# Patient Record
Sex: MALE | Race: WHITE | NOT HISPANIC OR LATINO | Employment: FULL TIME | URBAN - METROPOLITAN AREA
[De-identification: names, ages, dates, MRNs, and addresses within clinical notes are randomized per-mention and may not be internally consistent; named-entity substitution may affect disease eponyms.]

---

## 2019-10-13 ENCOUNTER — OUTSIDE PLACE OF SERVICE (OUTPATIENT)
Dept: ADMINISTRATIVE | Facility: OTHER | Age: 54
End: 2019-10-13
Payer: COMMERCIAL

## 2019-10-13 PROCEDURE — 99223 1ST HOSP IP/OBS HIGH 75: CPT | Mod: ,,, | Performed by: UROLOGY

## 2019-10-13 PROCEDURE — 99223 PR INITIAL HOSPITAL CARE,LEVL III: ICD-10-PCS | Mod: ,,, | Performed by: UROLOGY

## 2019-11-04 ENCOUNTER — LAB VISIT (OUTPATIENT)
Dept: LAB | Facility: HOSPITAL | Age: 54
End: 2019-11-04
Attending: UROLOGY
Payer: COMMERCIAL

## 2019-11-04 ENCOUNTER — OFFICE VISIT (OUTPATIENT)
Dept: UROLOGY | Facility: CLINIC | Age: 54
End: 2019-11-04
Payer: COMMERCIAL

## 2019-11-04 VITALS
HEART RATE: 82 BPM | DIASTOLIC BLOOD PRESSURE: 69 MMHG | BODY MASS INDEX: 28.72 KG/M2 | SYSTOLIC BLOOD PRESSURE: 118 MMHG | HEIGHT: 70 IN | WEIGHT: 200.63 LBS

## 2019-11-04 DIAGNOSIS — N40.0 BPH WITHOUT URINARY OBSTRUCTION: ICD-10-CM

## 2019-11-04 DIAGNOSIS — R97.20 ELEVATED PSA: Primary | ICD-10-CM

## 2019-11-04 DIAGNOSIS — R97.20 ELEVATED PSA: ICD-10-CM

## 2019-11-04 PROCEDURE — 84153 ASSAY OF PSA TOTAL: CPT

## 2019-11-04 PROCEDURE — 3008F PR BODY MASS INDEX (BMI) DOCUMENTED: ICD-10-PCS | Mod: CPTII,S$GLB,, | Performed by: UROLOGY

## 2019-11-04 PROCEDURE — 99999 PR PBB SHADOW E&M-NEW PATIENT-LVL III: CPT | Mod: PBBFAC,,, | Performed by: UROLOGY

## 2019-11-04 PROCEDURE — 36415 COLL VENOUS BLD VENIPUNCTURE: CPT | Mod: PO

## 2019-11-04 PROCEDURE — 99214 OFFICE O/P EST MOD 30 MIN: CPT | Mod: S$GLB,,, | Performed by: UROLOGY

## 2019-11-04 PROCEDURE — 99214 PR OFFICE/OUTPT VISIT, EST, LEVL IV, 30-39 MIN: ICD-10-PCS | Mod: S$GLB,,, | Performed by: UROLOGY

## 2019-11-04 PROCEDURE — 3008F BODY MASS INDEX DOCD: CPT | Mod: CPTII,S$GLB,, | Performed by: UROLOGY

## 2019-11-04 PROCEDURE — 99999 PR PBB SHADOW E&M-NEW PATIENT-LVL III: ICD-10-PCS | Mod: PBBFAC,,, | Performed by: UROLOGY

## 2019-11-04 RX ORDER — BISACODYL 5 MG
TABLET, DELAYED RELEASE (ENTERIC COATED) ORAL
COMMUNITY
End: 2020-06-10

## 2019-11-04 RX ORDER — SIMVASTATIN 20 MG/1
20 TABLET, FILM COATED ORAL NIGHTLY
Refills: 5 | COMMUNITY
Start: 2019-10-24

## 2019-11-04 RX ORDER — LISINOPRIL 10 MG/1
10 TABLET ORAL DAILY
Refills: 3 | COMMUNITY
Start: 2019-08-26 | End: 2020-06-10

## 2019-11-04 RX ORDER — APIXABAN 5 MG/1
5 TABLET, FILM COATED ORAL 2 TIMES DAILY
Refills: 1 | Status: ON HOLD | COMMUNITY
Start: 2019-10-16 | End: 2019-12-12

## 2019-11-04 RX ORDER — METOPROLOL TARTRATE 25 MG/1
25 TABLET, FILM COATED ORAL 2 TIMES DAILY
Refills: 6 | COMMUNITY
Start: 2019-10-25

## 2019-11-04 RX ORDER — DIGOXIN 250 MCG
TABLET ORAL
Refills: 1 | COMMUNITY
Start: 2019-10-16 | End: 2020-06-10

## 2019-11-04 NOTE — PROGRESS NOTES
Subjective:       Patient ID: Kristofer Angel is a 54 y.o. male.    Chief Complaint: Elevated PSA    HPI     54-year-old who initially presented with a superficial skin infection in his axilla.  He was treated with course of Bactrim and he also received a flu shot at the same time.  He then began having high fevers along with a global skin rash.  He was seen in the emergency room and was admitted.  His lactate was elevated and his urinalysis did show positive leukocyte esterase.  He was diagnosed with possible prostatitis.  His urine cultures and blood cultures were negative.  He completed a course of Levaquin.  Also at the time of his evaluation in the emergency room along with a battery of other lab test a PSA was drawn which was elevated at 10.3.  He has no family history of prostate cancer.  No previous PSA.  Today he has no symptoms the rash is resolved he has no more fever he has no bothersome urinary symptoms.    Past Medical History:   Diagnosis Date    Atrial fibrillation        History reviewed. No pertinent surgical history.      Current Outpatient Medications:     bisacodyl (DULCOLAX, BISACODYL,) 5 mg EC tablet, Dulcolax (bisacodyl) 5 mg tablet,delayed release  Take 2 tablets every day by oral route as directed for 1 day., Disp: , Rfl:     digoxin (LANOXIN) 250 mcg tablet, TAKE 1 TABLET BY MOUTH EVERY DAY AT 12 00, Disp: , Rfl: 1    ELIQUIS 5 mg Tab, Take 5 mg by mouth 2 (two) times daily., Disp: , Rfl: 1    lisinopril 10 MG tablet, Take 10 mg by mouth once daily., Disp: , Rfl: 3    metoprolol tartrate (LOPRESSOR) 25 MG tablet, Take 25 mg by mouth 2 (two) times daily., Disp: , Rfl: 6    simvastatin (ZOCOR) 20 MG tablet, Take 20 mg by mouth nightly., Disp: , Rfl: 5     Review of Systems   Constitutional: Negative for fever.   Genitourinary: Negative for dysuria and hematuria.       Objective:      Physical Exam   Constitutional: He is oriented to person, place, and time. He appears well-developed  and well-nourished.   HENT:   Head: Normocephalic and atraumatic.   Eyes: Conjunctivae are normal.   Cardiovascular: Normal rate.   Pulmonary/Chest: Effort normal.   Genitourinary: Rectal exam shows no mass and anal tone normal. Prostate is enlarged (30g, s/s/a). Prostate is not tender.   Musculoskeletal: Normal range of motion. He exhibits no edema.   Neurological: He is alert and oriented to person, place, and time.   Skin: Skin is warm and dry. No rash noted.   Psychiatric: He has a normal mood and affect.   Vitals reviewed.      Assessment:       1. Elevated PSA    2. BPH without urinary obstruction        Plan:       Elevated PSA  -     Prostate Specific Antigen, Diagnostic; Future; Expected date: 11/04/2019    BPH without urinary obstruction      We discussed screening PSA and its limitation.  We discussed the possibility of early stage prostate cancer.  We discussed prostate needle biopsy.  I recommend we repeat PSA for now.

## 2019-11-05 ENCOUNTER — TELEPHONE (OUTPATIENT)
Dept: UROLOGY | Facility: CLINIC | Age: 54
End: 2019-11-05

## 2019-11-05 LAB — COMPLEXED PSA SERPL-MCNC: 2.1 NG/ML (ref 0–4)

## 2019-11-05 NOTE — TELEPHONE ENCOUNTER
----- Message from Frannie Eugene sent at 11/5/2019  1:00 PM CST -----  Contact: emilie-wife  Pt wife Emilie states that pt had some labs done yesterday and supposed to get results today. So she is call ing for those....[phone]

## 2019-11-22 DIAGNOSIS — I48.91 ATRIAL FIBRILLATION, UNSPECIFIED TYPE: Primary | ICD-10-CM

## 2019-12-02 ENCOUNTER — OFFICE VISIT (OUTPATIENT)
Dept: ELECTROPHYSIOLOGY | Facility: CLINIC | Age: 54
End: 2019-12-02
Payer: COMMERCIAL

## 2019-12-02 VITALS
SYSTOLIC BLOOD PRESSURE: 134 MMHG | DIASTOLIC BLOOD PRESSURE: 80 MMHG | WEIGHT: 205 LBS | HEIGHT: 70 IN | BODY MASS INDEX: 29.35 KG/M2 | HEART RATE: 67 BPM

## 2019-12-02 DIAGNOSIS — I10 ESSENTIAL HYPERTENSION: Chronic | ICD-10-CM

## 2019-12-02 DIAGNOSIS — I48.91 ATRIAL FIBRILLATION, UNSPECIFIED TYPE: ICD-10-CM

## 2019-12-02 DIAGNOSIS — I48.0 PAROXYSMAL ATRIAL FIBRILLATION: Primary | Chronic | ICD-10-CM

## 2019-12-02 PROCEDURE — 99203 PR OFFICE/OUTPT VISIT, NEW, LEVL III, 30-44 MIN: ICD-10-PCS | Mod: S$GLB,,, | Performed by: INTERNAL MEDICINE

## 2019-12-02 PROCEDURE — 93010 RHYTHM STRIP: ICD-10-PCS | Mod: S$GLB,,, | Performed by: INTERNAL MEDICINE

## 2019-12-02 PROCEDURE — 99999 PR PBB SHADOW E&M-EST. PATIENT-LVL IV: ICD-10-PCS | Mod: PBBFAC,,, | Performed by: INTERNAL MEDICINE

## 2019-12-02 PROCEDURE — 99999 PR PBB SHADOW E&M-EST. PATIENT-LVL IV: CPT | Mod: PBBFAC,,, | Performed by: INTERNAL MEDICINE

## 2019-12-02 PROCEDURE — 93010 ELECTROCARDIOGRAM REPORT: CPT | Mod: S$GLB,,, | Performed by: INTERNAL MEDICINE

## 2019-12-02 PROCEDURE — 93005 ELECTROCARDIOGRAM TRACING: CPT | Mod: S$GLB,,, | Performed by: INTERNAL MEDICINE

## 2019-12-02 PROCEDURE — 99203 OFFICE O/P NEW LOW 30 MIN: CPT | Mod: S$GLB,,, | Performed by: INTERNAL MEDICINE

## 2019-12-02 PROCEDURE — 93005 RHYTHM STRIP: ICD-10-PCS | Mod: S$GLB,,, | Performed by: INTERNAL MEDICINE

## 2019-12-02 RX ORDER — NAPROXEN SODIUM 220 MG/1
81 TABLET, FILM COATED ORAL DAILY
COMMUNITY

## 2019-12-02 NOTE — PROGRESS NOTES
Patient Instructions  Implant of Loop Recorder       Important Medication Information:    · You may take your usual morning medications on the day of your procedure.       Day of Procedure:    12/12/19 at 9 AM   Please report to Cardiology Waiting Room on the 3rd floor of the Hospital    · Wash your chest with HIBICLENS OR AN ANTIBACTERIAL SOAP (such as Dial) on the night before and the morning of your procedure.  · You do not need to fast for this procedure since only a local anesthesia (numbing) will be used.    · You will be going home after your procedure.   · You may want someone to drive you home from the hospital.        Directions to Cardiology Waiting Room  If you park in the Parking Garage:  Take elevators to the 2nd floor  Walk up ramp and turn right by Gold Elevators  Take elevator to the 3rd floor  Upon exiting the elevator, turn away from the clinic areas  Walk long davey around to front of hospital to area with windows overlooking [facility]  Check in at Reception Desk  OR  If family is dropping you off:  Have them drop you off at the front of the Hospital  (Near the ER, where all the flags are hung).  Take the E elevators to the 3rd floor.  Check in at the Reception Desk in the waiting room.      Post-Procedure Patient Discharge Instructions  Loop Recorders    Wound Care   If you are discharged with a standard dressing over the incision, you may remove the dressing after 24 hours.    If there are Steri-strips (strips of tape) over your incision, leave them on until your follow-up appointment. They may begin to fall off on their own, which is normal. If there is Dermabond (clear glue) over your incision, do not scrub it off. It acts as a barrier and will eventually disappear.   You may be discharged with 5 days of oral antibiotics. Please take the full prescription until it is gone.   Do not get the incision wet for 48 hours following the procedure. You may sponge bath during this period,  working around the incision. After 48 hours, you may shower, but you should still try to keep this area as dry as possible, and avoid direct water contact to the incision (allow the water to hit back of your shoulder rather than directly on the incision). Gently pat the incision dry if it does get wet.   You may take regular showers after 2 weeks, unless otherwise indicated at your follow-up visit.   Do not submerge the incision in a tub, pool, or body of water for 6 weeks.   If you notice unusual swelling, redness, drainage, have more device site pain, chest pain, shortness of breath, or have a fever greater than 100 degrees, call our device clinic immediately: [phone] or [phone] during normal office hours. You may call [phone] after-hours or on weekends and ask for the electrophysiologist on call.  Activity    If you were driving prior to the procedure, you may resume driving right after your procedure (as long you did not receive any sedation). If you have a history of passing out or a history of certain arrhythmias, there may be driving restrictions unrelated to the procedure. Please clarify with your physician if this is the case.   Avoid rough contact at the device site for 6 weeks.   You may participate in sexual activity unless otherwise instructed.   You may return to work the follow day unless told otherwise by your provider.  Long-Term Instructions   Metal Detectors at Airports: Metal detectors at airports do not interfere with you loop recorder.   MRI: You may have an MRI with an implantable loop recorder. All that is required is that you send a transmission to download your information before and after you have your MRI.  Long-Term Follow-Up   Your device has the ability to transmit device information from home to the doctors office using a home monitoring system.   This remote system takes the place of a doctors visit. Your device will be checked from home every 31  days. Every 12 months, you will be asked to come into the office for an in-office check.   Your device should last in the range of 3 years.       Any need to reschedule or cancel procedures, or any questions regarding your procedures should be addressed directly with the Arrhythmia Department Nurses at the following phone number: [phone].

## 2019-12-02 NOTE — PROGRESS NOTES
Subjective:    Patient ID:  Kristofer Angel is a 54 y.o. male who presents for evaluation of Atrial Fibrillation    HPI  Mr Angel is a 54 year old man with HTN who [place] [facility] as a new patient, self referral for second opinion regarding his atrial fibrillation. In mid October, he developed two boils under his left arm. He then developed sepsis and was admitted to an outside hospital ([facility] in [place]) with fevers of 104. He was also significantly dehydrated and in KRISTINE. He was incidentally noted to be in atrial fibrillation during this acute illness. He did not feel his heart racing. He was started on rate control agents and spontaneously converted after about 1.5 days per his recollection. In total, he was admitted for about 5 days. As part of that work up/hospital stay, he also had stress testing and coronary angiography (which revealed no significant coronary disease). He was started on apixaban, metoprolol, and digoxin for his atrial fibrillation. He wore a 5 day zio patch with Dr Giordano as an outpatient since being discharged. He thinks that was normal without afib, but those records are not available for review. He has had no symptomatic recurrences of afib. Once he was started on metoprolol, his lisinopril was discontinued. He snores but has no formal YURI diagnosis. He feels generally well and has taken himself off of apixaban. 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 He has no syncope or presyncope. His EF is normal based on outside records. He was offered an ILR by Dr Giordano for longer term monitoring but wanted to get a second opinion first.     ECG today:  Sinus rhythm     Review of Systems   Constitution: Negative for chills, decreased appetite, diaphoresis, malaise/fatigue and weight loss.   HENT: Negative for congestion, hearing loss, nosebleeds and sore throat.    Eyes: Negative for pain, redness and visual disturbance.   Cardiovascular: Negative for  chest pain, dyspnea on exertion, irregular heartbeat, leg swelling, orthopnea, palpitations and syncope.   Respiratory: Negative for cough, shortness of breath, sleep disturbances due to breathing and wheezing.    Endocrine: Negative for cold intolerance and heat intolerance.   Hematologic/Lymphatic: Negative for bleeding problem. Does not bruise/bleed easily.   Skin: Negative for color change, dry skin, poor wound healing and rash.   Musculoskeletal: Negative for arthritis, back pain, falls, joint pain, muscle weakness and myalgias.   Gastrointestinal: Negative for abdominal pain, change in bowel habit, constipation, diarrhea, hematochezia, melena and vomiting.   Genitourinary: Negative for dysuria, frequency, hematuria, nocturia and urgency.   Neurological: Negative for difficulty with concentration, disturbances in coordination, dizziness, focal weakness, headaches, light-headedness, numbness and weakness.   Psychiatric/Behavioral: Negative for altered mental status. The patient does not have insomnia.          Objective:    Physical Exam   Constitutional: He is oriented to person, place, and time. He appears well-developed and well-nourished. No distress.   HENT:   Head: Normocephalic and atraumatic.   Eyes: Conjunctivae and EOM are normal. Right eye exhibits no discharge. Left eye exhibits no discharge.   Neck: Neck supple. No JVD present.   Cardiovascular: Normal rate, regular rhythm, normal heart sounds and intact distal pulses.   No murmur heard.  Pulmonary/Chest: Effort normal and breath sounds normal. No respiratory distress. He has no wheezes. He has no rales. He exhibits no tenderness.   Abdominal: Soft. He exhibits no distension. There is no tenderness.   Musculoskeletal: Normal range of motion. He exhibits no edema.   Neurological: He is alert and oriented to person, place, and time.   Skin: Skin is warm and dry.   Vitals reviewed.        Assessment:       1. Paroxysmal atrial fibrillation    2. Atrial  fibrillation, unspecified type    3. Essential hypertension         Plan:       Mr Angel is a 54 year old man with HTN who presents to [facility] as a new patient, self referral for second opinion regarding his atrial fibrillation.       1. AFib, paroxysmal  - occurred in setting of acute illness (sepsis, dehydration, KRISTINE). No symptomatic recurrence. No recurrence based on outpatient 5 day zio patch (per pt)  - his [id number] is 1 (HTN). Agree that he can come off of apixaban for now. Stay on aspirin 81 mg po once daily. Discontinue digoxin given narrow therapeutic window and current sinus rhythm with no afib recurrence yet detected.   - continue metoprolol for now as it is also providing some antihypertensive effects. If his primary or general cardiologist need to adjust/address his antihypertensive regimen, we would agree with metoprolol discontinuation in favor of ACEi/ARB, dihydropyridine calcium channel antagonist, or thiazide diuretic.   - we would like to proceed with implantable loop recorder for continued monitoring. If AFib is detected in the future, we would then proceed with more aggressive medical therapy including rhythm control strategies and resumption of anticoagulation.     He will return to clinic 3 months after ILR is placed.

## 2019-12-02 NOTE — H&P (VIEW-ONLY)
Subjective:    Patient ID:  Kristofer Angel is a 54 y.o. male who presents for evaluation of Atrial Fibrillation    HPI  Mr Angel is a 54 year old man with HTN who presents to [facility] as a new patient, self referral for second opinion regarding his atrial fibrillation. In mid October, he developed two boils under his left arm. He then developed sepsis and was admitted to an outside hospital ([facility] in [place]) with fevers of 104. He was also significantly dehydrated and in KRISTINE. He was incidentally noted to be in atrial fibrillation during this acute illness. He did not feel his heart racing. He was started on rate control agents and spontaneously converted after about 1.5 days per his recollection. In total, he was admitted for about 5 days. As part of that work up/hospital stay, he also had stress testing and coronary angiography (which revealed no significant coronary disease). He was started on apixaban, metoprolol, and digoxin for his atrial fibrillation. He wore a 5 day zio patch with Dr Giordano as an outpatient since being discharged. He thinks that was normal without afib, but those records are not available for review. He has had no symptomatic recurrences of afib. Once he was started on metoprolol, his lisinopril was discontinued. He snores but has no formal YURI diagnosis. He feels generally well and has taken himself off of apixaban. 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 He has no syncope or presyncope. His EF is normal based on outside records. He was offered an ILR by Dr Giordano for longer term monitoring but wanted to get a second opinion first.     ECG today:  Sinus rhythm     Review of Systems   Constitution: Negative for chills, decreased appetite, diaphoresis, malaise/fatigue and weight loss.   HENT: Negative for congestion, hearing loss, nosebleeds and sore throat.    Eyes: Negative for pain, redness and visual disturbance.   Cardiovascular: Negative for  chest pain, dyspnea on exertion, irregular heartbeat, leg swelling, orthopnea, palpitations and syncope.   Respiratory: Negative for cough, shortness of breath, sleep disturbances due to breathing and wheezing.    Endocrine: Negative for cold intolerance and heat intolerance.   Hematologic/Lymphatic: Negative for bleeding problem. Does not bruise/bleed easily.   Skin: Negative for color change, dry skin, poor wound healing and rash.   Musculoskeletal: Negative for arthritis, back pain, falls, joint pain, muscle weakness and myalgias.   Gastrointestinal: Negative for abdominal pain, change in bowel habit, constipation, diarrhea, hematochezia, melena and vomiting.   Genitourinary: Negative for dysuria, frequency, hematuria, nocturia and urgency.   Neurological: Negative for difficulty with concentration, disturbances in coordination, dizziness, focal weakness, headaches, light-headedness, numbness and weakness.   Psychiatric/Behavioral: Negative for altered mental status. The patient does not have insomnia.          Objective:    Physical Exam   Constitutional: He is oriented to person, place, and time. He appears well-developed and well-nourished. No distress.   HENT:   Head: Normocephalic and atraumatic.   Eyes: Conjunctivae and EOM are normal. Right eye exhibits no discharge. Left eye exhibits no discharge.   Neck: Neck supple. No JVD present.   Cardiovascular: Normal rate, regular rhythm, normal heart sounds and intact distal pulses.   No murmur heard.  Pulmonary/Chest: Effort normal and breath sounds normal. No respiratory distress. He has no wheezes. He has no rales. He exhibits no tenderness.   Abdominal: Soft. He exhibits no distension. There is no tenderness.   Musculoskeletal: Normal range of motion. He exhibits no edema.   Neurological: He is alert and oriented to person, place, and time.   Skin: Skin is warm and dry.   Vitals reviewed.        Assessment:       1. Paroxysmal atrial fibrillation    2. Atrial  fibrillation, unspecified type    3. Essential hypertension         Plan:       Mr Angel is a 54 year old man with HTN who [place] [facility] as a new patient, self referral for second opinion regarding his atrial fibrillation.       1. AFib, paroxysmal  - occurred in setting of acute illness (sepsis, dehydration, KRISTINE). No symptomatic recurrence. No recurrence based on outpatient 5 day zio patch (per pt)  - his [id number] is 1 (HTN). Agree that he can come off of apixaban for now. Stay on aspirin 81 mg po once daily. Discontinue digoxin given narrow therapeutic window and current sinus rhythm with no afib recurrence yet detected.   - continue metoprolol for now as it is also providing some antihypertensive effects. If his primary or general cardiologist need to adjust/address his antihypertensive regimen, we would agree with metoprolol discontinuation in favor of ACEi/ARB, dihydropyridine calcium channel antagonist, or thiazide diuretic.   - we would like to proceed with implantable loop recorder for continued monitoring. If AFib is detected in the future, we would then proceed with more aggressive medical therapy including rhythm control strategies and resumption of anticoagulation.     He will return to clinic 3 months after ILR is placed.

## 2019-12-11 ENCOUNTER — TELEPHONE (OUTPATIENT)
Dept: ELECTROPHYSIOLOGY | Facility: CLINIC | Age: 54
End: 2019-12-11

## 2019-12-11 NOTE — TELEPHONE ENCOUNTER
Attempted to reach pt to confirm procedure for tomorrow. No answer. Left voice message with number for pt to return call. Advised that his procedure time was pushed back for an arrival time of 11 am. Asked for pt to return call.

## 2019-12-12 ENCOUNTER — HOSPITAL ENCOUNTER (OUTPATIENT)
Facility: HOSPITAL | Age: 54
Discharge: HOME | End: 2019-12-12
Attending: INTERNAL MEDICINE | Admitting: INTERNAL MEDICINE
Payer: COMMERCIAL

## 2019-12-12 VITALS
RESPIRATION RATE: 18 BRPM | HEART RATE: 62 BPM | OXYGEN SATURATION: 96 % | TEMPERATURE: 97 F | HEIGHT: 70 IN | SYSTOLIC BLOOD PRESSURE: 123 MMHG | WEIGHT: 205 LBS | DIASTOLIC BLOOD PRESSURE: 82 MMHG | BODY MASS INDEX: 29.35 KG/M2

## 2019-12-12 DIAGNOSIS — I48.0 PAF (PAROXYSMAL ATRIAL FIBRILLATION): Primary | ICD-10-CM

## 2019-12-12 DIAGNOSIS — I48.91 ATRIAL FIBRILLATION: ICD-10-CM

## 2019-12-12 PROCEDURE — 25000003 PHARM REV CODE 250: Performed by: INTERNAL MEDICINE

## 2019-12-12 PROCEDURE — C1764 EVENT RECORDER, CARDIAC: HCPCS | Performed by: INTERNAL MEDICINE

## 2019-12-12 PROCEDURE — 63600175 PHARM REV CODE 636 W HCPCS: Performed by: INTERNAL MEDICINE

## 2019-12-12 PROCEDURE — 33285 INSJ SUBQ CAR RHYTHM MNTR: CPT | Performed by: INTERNAL MEDICINE

## 2019-12-12 PROCEDURE — 33285 PR INSERTION,SUBQ CARDIAC RHYTHM MONITOR, W/PRGRMG: ICD-10-PCS | Mod: ,,, | Performed by: INTERNAL MEDICINE

## 2019-12-12 PROCEDURE — 93010 EKG 12-LEAD: ICD-10-PCS | Mod: ,,, | Performed by: INTERNAL MEDICINE

## 2019-12-12 PROCEDURE — 93010 ELECTROCARDIOGRAM REPORT: CPT | Mod: ,,, | Performed by: INTERNAL MEDICINE

## 2019-12-12 PROCEDURE — 33285 INSJ SUBQ CAR RHYTHM MNTR: CPT | Mod: ,,, | Performed by: INTERNAL MEDICINE

## 2019-12-12 PROCEDURE — 93005 ELECTROCARDIOGRAM TRACING: CPT | Mod: 59

## 2019-12-12 DEVICE — MON LNQ11 REVEAL LINQ USA FW2.0
Type: IMPLANTABLE DEVICE | Site: CHEST | Status: FUNCTIONAL
Brand: REVEAL LINQ™

## 2019-12-12 RX ORDER — CEFAZOLIN SODIUM 1 G/3ML
2 INJECTION, POWDER, FOR SOLUTION INTRAMUSCULAR; INTRAVENOUS
Status: DISCONTINUED | OUTPATIENT
Start: 2019-12-12 | End: 2019-12-12 | Stop reason: HOSPADM

## 2019-12-12 RX ORDER — CEFAZOLIN SODIUM 1 G/3ML
INJECTION, POWDER, FOR SOLUTION INTRAMUSCULAR; INTRAVENOUS
Status: DISCONTINUED | OUTPATIENT
Start: 2019-12-12 | End: 2019-12-12 | Stop reason: HOSPADM

## 2019-12-12 RX ORDER — LIDOCAINE HYDROCHLORIDE 20 MG/ML
INJECTION, SOLUTION EPIDURAL; INFILTRATION; INTRACAUDAL; PERINEURAL
Status: DISCONTINUED | OUTPATIENT
Start: 2019-12-12 | End: 2019-12-12 | Stop reason: HOSPADM

## 2019-12-12 NOTE — DISCHARGE SUMMARY
[facility] - Short Stay Cardiac Unit  Cardiac Electrophysiology  Discharge Summary      Patient Name: Kristofer Agnel  MRN: [id number]  Admission Date: 12/12/2019  Hospital Length of Stay: 0 days  Discharge Date and Time:  12/12/2019 1:32 PM  Attending Physician: Ant Wilkins MD    Discharging Provider: Lisa Escobar NP  Primary Care Physician: Kristofer Gaytan MD    HPI:     Mr Angel is a 54 year old man with PMH  HTN, PAF - occurred in setting of acute illness (sepsis, dehydration, KRISTINE). No symptomatic recurrence. No recurrence based on outpatient 5 day zio patch (per pt)   [id number] is 1 (HTN).   Patient off ELiquis and is currently on ASA 81 mg once daily.   Per clinic plans > plan for ILR implantation If AFib is detected in the future, we would then proceed with more aggressive medical therapy including rhythm control strategies and resumption of anticoagulation.      Patient presented to short stay cardiac unit on 12/12/19 for ILR implantation    ,Patient denies any  noted bleeding,  overt shortness of breath, chest pains, rash , fevers, chills, or any other acute symptoms.  ECG today reveals NSR at 66 BPM        Procedure(s) (LRB):  INSERTION, IMPLANTABLE LOOP RECORDER (Left)          Hospital Course:  Pt underwent ILR implant ( see procedure note for details), tolerated procedure, no acute complications noted. Left chest site with aquacel foam dressing clean dry and intact. NO bleeding, swelling or hematoma   Pt to follow up with device clinic in 1 week for wound check , and 3  months with MD Claudette Cain  Discharge plans/instructions discussed with patient and his spouse mrs Angel at bedside   who verbalized understanding and agreement of plans of care.  No further questions or concern voiced at this time.         Final Active Diagnoses:    Diagnosis Date Noted POA    PRINCIPAL PROBLEM:  PAF (paroxysmal atrial fibrillation) [I48.0] 12/12/2019 Yes      Problems Resolved During this  Admission:       Discharged Condition: good    Disposition: Home or Self Care    Follow Up:  Follow-up Information     [facility] In 1 week.    Specialty:  Arrhythmia  Why:  For wound re-check  Contact information:  [address]  [facility] [id number] [phone]           Ant Wilkins MD In 3 months.    Specialties:  Electrophysiology, Cardiovascular Disease, Cardiology  Contact information:  [address]  [facility] [id number]  [phone]                 Patient Instructions:      Diet Cardiac     Notify your health care provider if you experience any of the following:  temperature >100.4     Notify your health care provider if you experience any of the following:  persistent nausea and vomiting or diarrhea     Notify your health care provider if you experience any of the following:  severe uncontrolled pain     Notify your health care provider if you experience any of the following:  redness, tenderness, or signs of infection (pain, swelling, redness, odor or green/yellow discharge around incision site)     Notify your health care provider if you experience any of the following:  difficulty breathing or increased cough     Notify your health care provider if you experience any of the following:  severe persistent headache     Notify your health care provider if you experience any of the following:  worsening rash     Notify your health care provider if you experience any of the following:  persistent dizziness, light-headedness, or visual disturbances     Notify your health care provider if you experience any of the following:  increased confusion or weakness     Notify your health care provider if you experience any of the following:   Order Comments: For any concerning medical symptoms     Remove dressing in 48 hours     Shower on day dressing removed (No bath)     Medications:  Reconciled Home Medications:      Medication List      CONTINUE taking these medications    aspirin 81  MG Chew  Take 81 mg by mouth once daily.     metoprolol tartrate 25 MG tablet  Commonly known as:  LOPRESSOR  Take 25 mg by mouth 2 (two) times daily.     simvastatin 20 MG tablet  Commonly known as:  ZOCOR  Take 20 mg by mouth nightly.        ASK your doctor about these medications    digoxin 250 mcg tablet  Commonly known as:  LANOXIN  TAKE 1 TABLET BY MOUTH EVERY DAY AT 12 00     Dulcolax (bisacodyl) 5 mg EC tablet  Generic drug:  bisacodyl  Dulcolax (bisacodyl) 5 mg tablet,delayed release   Take 2 tablets every day by oral route as directed for 1 day.     lisinopril 10 MG tablet  Take 10 mg by mouth once daily.            Time spent on the discharge of patient: 15  minutes    Lisa Escobar NP  Cardiac Electrophysiology  [facility]    STAFF MD Claudette Cain

## 2019-12-12 NOTE — PLAN OF CARE
Received report from Erum Gamboa. Patient s/p loop recorder insertion, AAOx3. VSS, no c/o pain or discomfort at this time, resp even and unlabored. Foam dressing to midline of chest is CDI. No active bleeding. No hematoma noted. Post procedure protocol reviewed with patient and patient's family. Understanding verbalized. Family members at bedside. Nurse call bell within reach. Will continue to monitor per post procedure protocol.

## 2019-12-12 NOTE — PROGRESS NOTES
Patient discharged per MD orders. Instructions given on medications, wound care, activity, signs of infection, when to call MD, and follow up appointments. Pt verbalized understanding.  Patient AAOx3, VSS, no c/o pain or discomfort at this time. PIV removed. Patient refused wheelchair and ambulated off unit with spouse.

## 2019-12-12 NOTE — INTERVAL H&P NOTE
The patient has been examined and the H&P has been reviewed:    I concur with the findings and no changes have occurred since H&P was written. patient off ELiquis and is currently on ASA 81 mg once daily.   Per clinic plans > plan for ILR implantation If AFib is detected in the future, we would then proceed with more aggressive medical therapy including rhythm control strategies and resumption of anticoagulation.     Patient presented to short stay cardiac unit on 12/12/19   ,Patient denies any  noted bleeding,  overt shortness of breath, chest pains, rash , fevers, chills, or any other acute symptoms.  ECG today reveals NSR at 66 BPM      PE:   General: Well developed, well nourished, No distress on room air   HEENT: Head is normocephalic, atraumatic;  Lungs: Clear to auscultation bilaterally.  No wheezing. Normal respiratory effort.  Cardiovascular:  S1 S2 Normal rate, regular rhythm and normal heart sounds.    PMI is not displaced  Extremities: No LE edema. Pulses 2+ and symmetric.   Abdomen: Abdomen is soft, non-tender non-distended with normal bowel sounds.  Skin: Skin color, texture, turgor normal. No rashes.  Musculoskeletal: normal range of motion   Neurologic: Normal strength and tone. No focal numbness or weakness. Reports chronic knee pain, walks with a walker   Psychiatric: normal mood and affect.  behavior is normal. Alert and oriented X 3.  Labs reviewed            * AF currently on aspirin 81 mg po once daily.ASMHY2ICHl is 1 (HTN)    ILR implantation for AF surveillance          Prior to procedure, extensive discussion with patient regarding risks and benefits of  ILR , and patient  would like to proceed.  The patient and  Spouse Mrs Angel  voices understanding and all questions have been answered. No further questions/concerns voiced at this time.       QUINTON Benson-BC  Cardiology Electrophysiology  NP   [facility]   STAFF MD Ant Wilkins               [facility]  Problems    Diagnosis  POA    PAF (paroxysmal atrial fibrillation) [I48.0]  Yes      Resolved Hospital Problems   No resolved problems to display.

## 2019-12-16 ENCOUNTER — TELEPHONE (OUTPATIENT)
Dept: ELECTROPHYSIOLOGY | Facility: CLINIC | Age: 54
End: 2019-12-16

## 2019-12-16 ENCOUNTER — TELEPHONE (OUTPATIENT)
Dept: CARDIOLOGY | Facility: HOSPITAL | Age: 54
End: 2019-12-16

## 2019-12-16 NOTE — TELEPHONE ENCOUNTER
Called pt's wife to let her know that no arrhythmias have shown on loop recorder since it was put in. She stated that was good to hear. Maybe he can relax now.         ----- Message from Terri Mijares RN sent at 12/16/2019  3:57 PM CST -----  Contact: Emilie pt wife [phone]  NO arrhythmias recorded in the lifetime of the device.    ----- Message -----  From: RT Doreen  Sent: 12/16/2019  11:03 AM CST  To: Terri Mijares RN    MDT ILR  ----- Message -----  From: Claudette Guerrero RN  Sent: 12/16/2019   9:59 AM CST  To: [facility] Staff    Pt thinks he experienced an episode of AFIb at 1 am this morning. Could you please look at tracings and let me know. Thanks  ----- Message -----  From: Zoe Prather  Sent: 12/16/2019   9:13 AM CST  To: Claudette Guerrero RN    Pt wife would like a call in ref to his meds.    Thanks

## 2019-12-16 NOTE — TELEPHONE ENCOUNTER
----- Message from Zoe Prather sent at 12/16/2019  9:14 AM CST -----  Contact: Emilie [phone] pt wife  Pt wife would like a call in ref to his loop recorder.    Thanks

## 2019-12-16 NOTE — TELEPHONE ENCOUNTER
Returned call to pt's wife. She states that during the night the pt thinks he had AFIB. He would like to know what shows on his loop. Also he was under the impression that he was to have antibiotics post loop placement.  Advised pts wife that I would have his loop checked to see what he experienced at 1 am and advised that no post op antibiotics are given post loop placement. Advised I  would give her a call later once loop tracings are reviewed.         ----- Message from Zoe Prather sent at 12/16/2019  9:13 AM CST -----  Contact: Emilie pt wife [phone]  Pt wife would like a call in ref to his meds.    Thanks

## 2019-12-19 ENCOUNTER — TELEPHONE (OUTPATIENT)
Dept: ELECTROPHYSIOLOGY | Facility: CLINIC | Age: 54
End: 2019-12-19

## 2019-12-23 ENCOUNTER — CLINICAL SUPPORT (OUTPATIENT)
Dept: CARDIOLOGY | Facility: HOSPITAL | Age: 54
End: 2019-12-23
Attending: INTERNAL MEDICINE
Payer: COMMERCIAL

## 2019-12-23 DIAGNOSIS — I48.0 PAROXYSMAL ATRIAL FIBRILLATION: ICD-10-CM

## 2019-12-23 PROCEDURE — 93291 INTERROG DEV EVAL SCRMS IP: CPT

## 2020-01-11 ENCOUNTER — CLINICAL SUPPORT (OUTPATIENT)
Dept: CARDIOLOGY | Facility: HOSPITAL | Age: 55
End: 2020-01-11
Attending: INTERNAL MEDICINE
Payer: COMMERCIAL

## 2020-01-11 DIAGNOSIS — I48.0 PAROXYSMAL ATRIAL FIBRILLATION: Chronic | ICD-10-CM

## 2020-01-11 PROCEDURE — 93298 CARDIAC DEVICE CHECK - REMOTE: ICD-10-PCS | Mod: ,,, | Performed by: INTERNAL MEDICINE

## 2020-01-11 PROCEDURE — 93298 REM INTERROG DEV EVAL SCRMS: CPT | Mod: ,,, | Performed by: INTERNAL MEDICINE

## 2020-02-10 ENCOUNTER — CLINICAL SUPPORT (OUTPATIENT)
Dept: CARDIOLOGY | Facility: HOSPITAL | Age: 55
End: 2020-02-10
Attending: INTERNAL MEDICINE
Payer: COMMERCIAL

## 2020-02-10 DIAGNOSIS — I48.0 PAROXYSMAL ATRIAL FIBRILLATION: Chronic | ICD-10-CM

## 2020-02-10 PROCEDURE — 93298 CARDIAC DEVICE CHECK - REMOTE: ICD-10-PCS | Mod: ,,, | Performed by: INTERNAL MEDICINE

## 2020-02-10 PROCEDURE — G2066 INTER DEVC REMOTE 30D: HCPCS | Performed by: INTERNAL MEDICINE

## 2020-02-10 PROCEDURE — 93298 REM INTERROG DEV EVAL SCRMS: CPT | Mod: ,,, | Performed by: INTERNAL MEDICINE

## 2020-03-11 ENCOUNTER — CLINICAL SUPPORT (OUTPATIENT)
Dept: CARDIOLOGY | Facility: HOSPITAL | Age: 55
End: 2020-03-11
Attending: INTERNAL MEDICINE
Payer: COMMERCIAL

## 2020-03-11 DIAGNOSIS — I48.0 PAROXYSMAL ATRIAL FIBRILLATION: Chronic | ICD-10-CM

## 2020-03-11 PROCEDURE — 93298 CARDIAC DEVICE CHECK - REMOTE: ICD-10-PCS | Mod: ,,, | Performed by: INTERNAL MEDICINE

## 2020-03-11 PROCEDURE — 93298 REM INTERROG DEV EVAL SCRMS: CPT | Mod: ,,, | Performed by: INTERNAL MEDICINE

## 2020-03-11 PROCEDURE — G2066 INTER DEVC REMOTE 30D: HCPCS | Performed by: INTERNAL MEDICINE

## 2020-03-16 ENCOUNTER — PATIENT MESSAGE (OUTPATIENT)
Dept: ELECTROPHYSIOLOGY | Facility: CLINIC | Age: 55
End: 2020-03-16

## 2020-03-16 ENCOUNTER — TELEPHONE (OUTPATIENT)
Dept: ELECTROPHYSIOLOGY | Facility: CLINIC | Age: 55
End: 2020-03-16

## 2020-03-16 NOTE — TELEPHONE ENCOUNTER
Returned call to Mrs. Angel re message I recv'd about pt not coming to Dr. Wilkins's apts today.  I adv Mrs. Angel Dr. Wilkins did not see pts, she said Mr. Angel is well and she wants to keep him that way.  I told her that was the smartest thing to do and we certainly understand.  Offered Telehealth apt with Dr. Wilkins in the next weeks; per Mrs. Angel, since he is doing well for now, they are going to hold off on scheduling any apts and will contact us once all this stuff has kind of settled down.

## 2020-03-16 NOTE — TELEPHONE ENCOUNTER
----- Message from Nuria De La Cruz sent at 3/16/2020  1:06 PM CDT -----  Contact: Pt's Wife Mrs Angel  Patient Advice:    Pt's Wife Mrs Angel called to speak to the nurse regarding the pt's care and appt's that the pt missed today due to COVID-19 and would like a call back today.    Mrs Angel can be reached at [phone]

## 2020-04-10 ENCOUNTER — CLINICAL SUPPORT (OUTPATIENT)
Dept: CARDIOLOGY | Facility: HOSPITAL | Age: 55
End: 2020-04-10
Attending: INTERNAL MEDICINE
Payer: COMMERCIAL

## 2020-04-10 DIAGNOSIS — I48.0 PAROXYSMAL ATRIAL FIBRILLATION: Chronic | ICD-10-CM

## 2020-04-10 PROCEDURE — G2066 INTER DEVC REMOTE 30D: HCPCS | Performed by: INTERNAL MEDICINE

## 2020-04-10 PROCEDURE — 93298 REM INTERROG DEV EVAL SCRMS: CPT | Mod: ,,, | Performed by: INTERNAL MEDICINE

## 2020-04-10 PROCEDURE — 93298 CARDIAC DEVICE CHECK - REMOTE: ICD-10-PCS | Mod: ,,, | Performed by: INTERNAL MEDICINE

## 2020-05-10 ENCOUNTER — CLINICAL SUPPORT (OUTPATIENT)
Dept: CARDIOLOGY | Facility: HOSPITAL | Age: 55
End: 2020-05-10
Attending: INTERNAL MEDICINE
Payer: COMMERCIAL

## 2020-05-10 DIAGNOSIS — I48.0 PAROXYSMAL ATRIAL FIBRILLATION: Chronic | ICD-10-CM

## 2020-05-10 PROCEDURE — G2066 INTER DEVC REMOTE 30D: HCPCS | Performed by: INTERNAL MEDICINE

## 2020-05-10 PROCEDURE — 93298 CARDIAC DEVICE CHECK - REMOTE: ICD-10-PCS | Mod: ,,, | Performed by: INTERNAL MEDICINE

## 2020-05-10 PROCEDURE — 93298 REM INTERROG DEV EVAL SCRMS: CPT | Mod: ,,, | Performed by: INTERNAL MEDICINE

## 2020-06-09 ENCOUNTER — TELEPHONE (OUTPATIENT)
Dept: ELECTROPHYSIOLOGY | Facility: CLINIC | Age: 55
End: 2020-06-09

## 2020-06-09 ENCOUNTER — CLINICAL SUPPORT (OUTPATIENT)
Dept: CARDIOLOGY | Facility: HOSPITAL | Age: 55
End: 2020-06-09
Attending: INTERNAL MEDICINE
Payer: COMMERCIAL

## 2020-06-09 DIAGNOSIS — I48.0 PAROXYSMAL ATRIAL FIBRILLATION: Chronic | ICD-10-CM

## 2020-06-09 DIAGNOSIS — Z95.818 PRESENCE OF OTHER CARDIAC IMPLANTS AND GRAFTS: ICD-10-CM

## 2020-06-09 PROCEDURE — G2066 INTER DEVC REMOTE 30D: HCPCS | Performed by: INTERNAL MEDICINE

## 2020-06-09 PROCEDURE — 93298 CARDIAC DEVICE CHECK - REMOTE: ICD-10-PCS | Mod: ,,, | Performed by: INTERNAL MEDICINE

## 2020-06-09 PROCEDURE — 93298 REM INTERROG DEV EVAL SCRMS: CPT | Mod: ,,, | Performed by: INTERNAL MEDICINE

## 2020-06-09 NOTE — PROGRESS NOTES
Mr. Angel is a patient of Dr. Wilkins and was last seen in clinic 12/2/2019.      Subjective:   Patient ID:  Kristofer Angel is a 55 y.o. male who presents for follow-up of Atrial Fibrillation  .     HPI:    Mr. Angel is a 55 y.o. male with AF, HTN here for follow up after ILR placement.    Background:    Mr Angel is a 55 year old man with HTN who presents to [facility] as a new patient, self referral for second opinion regarding his atrial fibrillation. In mid October 2019, he developed two boils under his left arm. He then developed sepsis and was admitted to an outside hospital ([facility]) with fevers of 104. He was also significantly dehydrated and in KRISTINE. He was incidentally noted to be in atrial fibrillation during this acute illness. He did not feel his heart racing. He was started on rate control agents and spontaneously converted after about 1.5 days per his recollection. In total, he was admitted for about 5 days. As part of that work up/hospital stay, he also had stress testing and coronary angiography (which revealed no significant coronary disease). He was started on apixaban, metoprolol, and digoxin for his atrial fibrillation. He wore a 5 day zio patch with Dr Giordano as an outpatient since being discharged. He thinks that was normal without afib, but those records are not available for review. He has had no symptomatic recurrences of afib. Once he was started on metoprolol, his lisinopril was discontinued. He snores but has no formal YURI diagnosis. He feels generally well and has taken himself off of apixaban. 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 He has no syncope or presyncope. His EF is normal based on outside records. He was offered an ILR by Dr Giordano for longer term monitoring but wanted to get a second opinion first.      AF occurred in setting of acute illness (sepsis, dehydration, KRISTINE). No symptomatic recurrence. No recurrence based on  outpatient 5 day zio patch (per pt)  His [id number] is 1 (HTN). Agree that he can come off of apixaban for now. Stay on aspirin 81 mg po once daily. Discontinue digoxin given narrow therapeutic window and current sinus rhythm with no afib recurrence yet detected.   Continue metoprolol for now as it is also providing some antihypertensive effects.  If AFib is detected in the future, we would then proceed with more aggressive medical therapy including rhythm control strategies and resumption of anticoagulation.      Update (06/10/2020):    12/2/2019: ILR placement.    Today he says he feels well. Mr. Angel denies chest pain with exertion or at rest, palpitations, SOB, HALEY, dizziness, or syncope.    ILR reports have shown no arrhythmia.    I have personally reviewed the patient's EKG today, which shows sinus rhythm at 73bpm. AL interval is 150. QRS is 80. QTc is 420.    Current Outpatient Medications   Medication Sig    aspirin 81 MG Chew Take 81 mg by mouth once daily.    metoprolol tartrate (LOPRESSOR) 25 MG tablet Take 25 mg by mouth 2 (two) times daily.    simvastatin (ZOCOR) 20 MG tablet Take 20 mg by mouth nightly.     No current facility-administered medications for this visit.      Review of Systems   Constitution: Negative for malaise/fatigue.   Cardiovascular: Negative for chest pain, dyspnea on exertion, irregular heartbeat, leg swelling and palpitations.   Respiratory: Negative for shortness of breath.    Hematologic/Lymphatic: Negative for bleeding problem.   Skin: Negative for rash.   Musculoskeletal: Negative for myalgias.   Gastrointestinal: Negative for hematemesis, hematochezia and nausea.   Genitourinary: Negative for hematuria.   Neurological: Negative for light-headedness.   Psychiatric/Behavioral: Negative for altered mental status.   Allergic/Immunologic: Negative for persistent infections.     Objective:        /84   Pulse 73     Physical Exam   Constitutional: He is oriented to  person, place, and time. He appears well-developed and well-nourished.   HENT:   Head: Normocephalic.   Nose: Nose normal.   Eyes: Pupils are equal, round, and reactive to light.   Cardiovascular: Normal rate, regular rhythm, S1 normal and S2 normal.   No murmur heard.  Pulses:       Radial pulses are 2+ on the right side, and 2+ on the left side.   Pulmonary/Chest: Breath sounds normal. No respiratory distress.   Abdominal: Normal appearance.   Musculoskeletal: Normal range of motion. He exhibits no edema.   Neurological: He is alert and oriented to person, place, and time.   Skin: Skin is warm and dry. No erythema.   Psychiatric: He has a normal mood and affect. His speech is normal and behavior is normal.   Nursing note and vitals reviewed.    Lab Results   Component Value Date     (L) 10/12/2019    K 4.0 10/12/2019    BUN 32 (H) 10/12/2019    CREATININE 2.63 (H) 10/12/2019    ALT 69 (H) 10/12/2019    AST 49 10/12/2019    HGB 13.5 (L) 10/12/2019    HCT 40.8 10/12/2019           Assessment:     1. Status post placement of implantable loop recorder    2. Paroxysmal atrial fibrillation    3. Essential hypertension      Plan:     In summary, Mr. Angel is a 55 y.o. male with AF, HTN here for follow up after ILR placement.  He is 6 months s/p ILR placement. No AF noted on device. Prior AF episode occurred in the context of sepsis. Now off OAC and on metoprolol and ASA only. He feels well.    Continue current regimen and device checks.  RTC 1 yr, sooner if needed.    *A copy of this note has been sent to Dr. Wilkins*    Follow up in about 1 year (around 6/10/2021).    ------------------------------------------------------------------    Kathy Dale, VIVIAN, NP-C  Cardiac Electrophysiology

## 2020-06-09 NOTE — TELEPHONE ENCOUNTER
Attempted to call patient's spouse Lisbet back, no answer. Left voicemail with direct contact info.

## 2020-06-09 NOTE — TELEPHONE ENCOUNTER
Spoke with pt's wife to schedule f/u appt with JF  ----- Message from Kristine Pace sent at 6/9/2020  1:46 PM CDT -----  Contact: Patient's wife Lisbet  The Pt's wife is calling to schedule his missed appointments. Please call her back @ [phone]. Thanks, Kristine

## 2020-06-10 ENCOUNTER — OFFICE VISIT (OUTPATIENT)
Dept: ELECTROPHYSIOLOGY | Facility: CLINIC | Age: 55
End: 2020-06-10
Payer: COMMERCIAL

## 2020-06-10 ENCOUNTER — HOSPITAL ENCOUNTER (OUTPATIENT)
Dept: CARDIOLOGY | Facility: CLINIC | Age: 55
Discharge: HOME | End: 2020-06-10
Payer: COMMERCIAL

## 2020-06-10 VITALS — DIASTOLIC BLOOD PRESSURE: 84 MMHG | HEART RATE: 73 BPM | SYSTOLIC BLOOD PRESSURE: 136 MMHG

## 2020-06-10 DIAGNOSIS — I48.0 PAROXYSMAL ATRIAL FIBRILLATION: Chronic | ICD-10-CM

## 2020-06-10 DIAGNOSIS — I48.91 ATRIAL FIBRILLATION, UNSPECIFIED TYPE: ICD-10-CM

## 2020-06-10 DIAGNOSIS — Z95.818 STATUS POST PLACEMENT OF IMPLANTABLE LOOP RECORDER: Primary | ICD-10-CM

## 2020-06-10 DIAGNOSIS — I10 ESSENTIAL HYPERTENSION: Chronic | ICD-10-CM

## 2020-06-10 PROCEDURE — 93010 RHYTHM STRIP: ICD-10-PCS | Mod: S$GLB,,, | Performed by: INTERNAL MEDICINE

## 2020-06-10 PROCEDURE — 99999 PR PBB SHADOW E&M-EST. PATIENT-LVL III: CPT | Mod: PBBFAC,,, | Performed by: NURSE PRACTITIONER

## 2020-06-10 PROCEDURE — 3079F DIAST BP 80-89 MM HG: CPT | Mod: CPTII,S$GLB,, | Performed by: NURSE PRACTITIONER

## 2020-06-10 PROCEDURE — 3079F PR MOST RECENT DIASTOLIC BLOOD PRESSURE 80-89 MM HG: ICD-10-PCS | Mod: CPTII,S$GLB,, | Performed by: NURSE PRACTITIONER

## 2020-06-10 PROCEDURE — 3075F SYST BP GE 130 - 139MM HG: CPT | Mod: CPTII,S$GLB,, | Performed by: NURSE PRACTITIONER

## 2020-06-10 PROCEDURE — 93010 ELECTROCARDIOGRAM REPORT: CPT | Mod: S$GLB,,, | Performed by: INTERNAL MEDICINE

## 2020-06-10 PROCEDURE — 93005 RHYTHM STRIP: ICD-10-PCS | Mod: S$GLB,,, | Performed by: INTERNAL MEDICINE

## 2020-06-10 PROCEDURE — 93005 ELECTROCARDIOGRAM TRACING: CPT | Mod: S$GLB,,, | Performed by: INTERNAL MEDICINE

## 2020-06-10 PROCEDURE — 3075F PR MOST RECENT SYSTOLIC BLOOD PRESS GE 130-139MM HG: ICD-10-PCS | Mod: CPTII,S$GLB,, | Performed by: NURSE PRACTITIONER

## 2020-06-10 PROCEDURE — 99214 PR OFFICE/OUTPT VISIT, EST, LEVL IV, 30-39 MIN: ICD-10-PCS | Mod: S$GLB,,, | Performed by: NURSE PRACTITIONER

## 2020-06-10 PROCEDURE — 99999 PR PBB SHADOW E&M-EST. PATIENT-LVL III: ICD-10-PCS | Mod: PBBFAC,,, | Performed by: NURSE PRACTITIONER

## 2020-06-10 PROCEDURE — 99214 OFFICE O/P EST MOD 30 MIN: CPT | Mod: S$GLB,,, | Performed by: NURSE PRACTITIONER

## 2020-07-09 ENCOUNTER — CLINICAL SUPPORT (OUTPATIENT)
Dept: CARDIOLOGY | Facility: HOSPITAL | Age: 55
End: 2020-07-09
Attending: INTERNAL MEDICINE
Payer: COMMERCIAL

## 2020-07-09 DIAGNOSIS — I48.0 PAROXYSMAL ATRIAL FIBRILLATION: Chronic | ICD-10-CM

## 2020-07-09 DIAGNOSIS — Z95.818 PRESENCE OF OTHER CARDIAC IMPLANTS AND GRAFTS: ICD-10-CM

## 2020-07-09 PROCEDURE — 93298 CARDIAC DEVICE CHECK - REMOTE: ICD-10-PCS | Mod: ,,, | Performed by: INTERNAL MEDICINE

## 2020-07-09 PROCEDURE — 93298 REM INTERROG DEV EVAL SCRMS: CPT | Mod: ,,, | Performed by: INTERNAL MEDICINE

## 2020-07-09 PROCEDURE — G2066 INTER DEVC REMOTE 30D: HCPCS | Performed by: INTERNAL MEDICINE

## 2020-08-08 ENCOUNTER — CLINICAL SUPPORT (OUTPATIENT)
Dept: CARDIOLOGY | Facility: HOSPITAL | Age: 55
End: 2020-08-08
Attending: INTERNAL MEDICINE
Payer: COMMERCIAL

## 2020-08-08 DIAGNOSIS — I48.0 PAROXYSMAL ATRIAL FIBRILLATION: Chronic | ICD-10-CM

## 2020-08-08 DIAGNOSIS — Z95.818 PRESENCE OF OTHER CARDIAC IMPLANTS AND GRAFTS: ICD-10-CM

## 2020-08-08 PROCEDURE — 93298 CARDIAC DEVICE CHECK - REMOTE: ICD-10-PCS | Mod: ,,, | Performed by: INTERNAL MEDICINE

## 2020-08-08 PROCEDURE — 93298 REM INTERROG DEV EVAL SCRMS: CPT | Mod: ,,, | Performed by: INTERNAL MEDICINE

## 2020-08-08 PROCEDURE — G2066 INTER DEVC REMOTE 30D: HCPCS | Performed by: INTERNAL MEDICINE

## 2020-09-07 ENCOUNTER — CLINICAL SUPPORT (OUTPATIENT)
Dept: CARDIOLOGY | Facility: HOSPITAL | Age: 55
End: 2020-09-07
Attending: INTERNAL MEDICINE
Payer: COMMERCIAL

## 2020-09-07 DIAGNOSIS — I48.0 PAROXYSMAL ATRIAL FIBRILLATION: Chronic | ICD-10-CM

## 2020-09-07 DIAGNOSIS — Z95.818 PRESENCE OF OTHER CARDIAC IMPLANTS AND GRAFTS: ICD-10-CM

## 2020-09-07 PROCEDURE — G2066 INTER DEVC REMOTE 30D: HCPCS | Performed by: INTERNAL MEDICINE

## 2020-09-07 PROCEDURE — 93298 REM INTERROG DEV EVAL SCRMS: CPT | Mod: ,,, | Performed by: INTERNAL MEDICINE

## 2020-09-07 PROCEDURE — 93298 CARDIAC DEVICE CHECK - REMOTE: ICD-10-PCS | Mod: ,,, | Performed by: INTERNAL MEDICINE

## 2020-09-16 ENCOUNTER — TELEPHONE (OUTPATIENT)
Dept: ELECTROPHYSIOLOGY | Facility: CLINIC | Age: 55
End: 2020-09-16

## 2020-09-16 NOTE — TELEPHONE ENCOUNTER
Returned call to pt's wife. B/P has been elevated at 172-102. Pt only taking metoprolol. Advised pt that he would need to follow up with PCP or general cardiology. Called Dr Wilkins to discuss. He recommends increasing metoprolol to 50 mg BID until follow up with cards or PCP.    Called pt to advise.           ----- Message from Zoe Prather sent at 9/16/2020  1:00 PM CDT -----  Regarding: Elevated pressure  Pt wife Emilie [phone] called stating pt pressure has been elevated. Yesterday his pressure was 171/102 before meds and she didn't take it today because he left for work.    Thanks

## 2020-10-01 NOTE — TELEPHONE ENCOUNTER
LVM for callback   Headache Monitoring: I recommended monitoring the headaches for now. There is no evidence of increased intracranial pressure. They were instructed to call if the headaches are worsening.

## 2020-10-07 ENCOUNTER — CLINICAL SUPPORT (OUTPATIENT)
Dept: CARDIOLOGY | Facility: HOSPITAL | Age: 55
End: 2020-10-07
Attending: FAMILY MEDICINE
Payer: COMMERCIAL

## 2020-10-07 DIAGNOSIS — Z95.818 PRESENCE OF OTHER CARDIAC IMPLANTS AND GRAFTS: ICD-10-CM

## 2020-10-07 PROCEDURE — 93298 CARDIAC DEVICE CHECK - REMOTE: ICD-10-PCS | Mod: ,,, | Performed by: INTERNAL MEDICINE

## 2020-10-07 PROCEDURE — G2066 INTER DEVC REMOTE 30D: HCPCS | Performed by: INTERNAL MEDICINE

## 2020-10-07 PROCEDURE — 93298 REM INTERROG DEV EVAL SCRMS: CPT | Mod: ,,, | Performed by: INTERNAL MEDICINE

## 2020-11-06 ENCOUNTER — CLINICAL SUPPORT (OUTPATIENT)
Dept: CARDIOLOGY | Facility: HOSPITAL | Age: 55
End: 2020-11-06
Payer: COMMERCIAL

## 2020-11-06 DIAGNOSIS — Z95.818 PRESENCE OF OTHER CARDIAC IMPLANTS AND GRAFTS: ICD-10-CM

## 2020-11-06 PROCEDURE — 93298 CARDIAC DEVICE CHECK - REMOTE: ICD-10-PCS | Mod: ,,, | Performed by: INTERNAL MEDICINE

## 2020-11-06 PROCEDURE — G2066 INTER DEVC REMOTE 30D: HCPCS | Performed by: INTERNAL MEDICINE

## 2020-11-06 PROCEDURE — 93298 REM INTERROG DEV EVAL SCRMS: CPT | Mod: ,,, | Performed by: INTERNAL MEDICINE

## 2020-12-06 ENCOUNTER — CLINICAL SUPPORT (OUTPATIENT)
Dept: CARDIOLOGY | Facility: HOSPITAL | Age: 55
End: 2020-12-06
Payer: COMMERCIAL

## 2020-12-06 DIAGNOSIS — Z95.818 PRESENCE OF OTHER CARDIAC IMPLANTS AND GRAFTS: ICD-10-CM

## 2020-12-06 PROCEDURE — 93298 CARDIAC DEVICE CHECK - REMOTE: ICD-10-PCS | Mod: ,,, | Performed by: INTERNAL MEDICINE

## 2020-12-06 PROCEDURE — G2066 INTER DEVC REMOTE 30D: HCPCS | Performed by: INTERNAL MEDICINE

## 2020-12-06 PROCEDURE — 93298 REM INTERROG DEV EVAL SCRMS: CPT | Mod: ,,, | Performed by: INTERNAL MEDICINE

## 2021-01-05 ENCOUNTER — CLINICAL SUPPORT (OUTPATIENT)
Dept: CARDIOLOGY | Facility: HOSPITAL | Age: 56
End: 2021-01-05
Payer: COMMERCIAL

## 2021-01-05 DIAGNOSIS — Z95.818 PRESENCE OF OTHER CARDIAC IMPLANTS AND GRAFTS: ICD-10-CM

## 2021-01-05 PROCEDURE — G2066 INTER DEVC REMOTE 30D: HCPCS | Performed by: INTERNAL MEDICINE

## 2021-01-05 PROCEDURE — 93298 CARDIAC DEVICE CHECK - REMOTE: ICD-10-PCS | Mod: ,,, | Performed by: INTERNAL MEDICINE

## 2021-01-05 PROCEDURE — 93298 REM INTERROG DEV EVAL SCRMS: CPT | Mod: ,,, | Performed by: INTERNAL MEDICINE

## 2021-02-04 ENCOUNTER — CLINICAL SUPPORT (OUTPATIENT)
Dept: CARDIOLOGY | Facility: HOSPITAL | Age: 56
End: 2021-02-04
Payer: COMMERCIAL

## 2021-02-04 DIAGNOSIS — Z95.818 PRESENCE OF OTHER CARDIAC IMPLANTS AND GRAFTS: ICD-10-CM

## 2021-02-04 PROCEDURE — 93298 CARDIAC DEVICE CHECK - REMOTE: ICD-10-PCS | Mod: ,,, | Performed by: INTERNAL MEDICINE

## 2021-02-04 PROCEDURE — G2066 INTER DEVC REMOTE 30D: HCPCS | Performed by: INTERNAL MEDICINE

## 2021-02-04 PROCEDURE — 93298 REM INTERROG DEV EVAL SCRMS: CPT | Mod: ,,, | Performed by: INTERNAL MEDICINE

## 2021-04-29 ENCOUNTER — PATIENT MESSAGE (OUTPATIENT)
Dept: RESEARCH | Facility: HOSPITAL | Age: 56
End: 2021-04-29

## (undated) DEVICE — ADHESIVE DERMABOND ADVANCED

## (undated) DEVICE — COVER PROBE ADHESIVE 8X72IN

## (undated) DEVICE — DRAPE OPTIMA MAJOR PEDIATRIC

## (undated) DEVICE — DRESSING AQUACEL FOAM 3 X 3